# Patient Record
Sex: MALE | Race: WHITE | NOT HISPANIC OR LATINO | Employment: FULL TIME | URBAN - METROPOLITAN AREA
[De-identification: names, ages, dates, MRNs, and addresses within clinical notes are randomized per-mention and may not be internally consistent; named-entity substitution may affect disease eponyms.]

---

## 2017-09-28 ENCOUNTER — OFFICE VISIT (OUTPATIENT)
Dept: FAMILY MEDICINE | Facility: CLINIC | Age: 27
End: 2017-09-28
Payer: COMMERCIAL

## 2017-09-28 VITALS
DIASTOLIC BLOOD PRESSURE: 60 MMHG | OXYGEN SATURATION: 99 % | HEART RATE: 95 BPM | WEIGHT: 156.6 LBS | BODY MASS INDEX: 23.13 KG/M2 | SYSTOLIC BLOOD PRESSURE: 130 MMHG | TEMPERATURE: 98.4 F

## 2017-09-28 DIAGNOSIS — Z00.01 ENCOUNTER FOR ROUTINE ADULT MEDICAL EXAM WITH ABNORMAL FINDINGS: Primary | ICD-10-CM

## 2017-09-28 DIAGNOSIS — Z13.220 LIPID SCREENING: ICD-10-CM

## 2017-09-28 DIAGNOSIS — R00.2 HEART PALPITATIONS: ICD-10-CM

## 2017-09-28 LAB
ALBUMIN SERPL-MCNC: 4.4 G/DL (ref 3.4–5)
ALBUMIN UR-MCNC: NEGATIVE MG/DL
ALP SERPL-CCNC: 67 U/L (ref 40–150)
ALT SERPL W P-5'-P-CCNC: 21 U/L (ref 0–70)
ANION GAP SERPL CALCULATED.3IONS-SCNC: 5 MMOL/L (ref 3–14)
APPEARANCE UR: CLEAR
AST SERPL W P-5'-P-CCNC: 7 U/L (ref 0–45)
BACTERIA #/AREA URNS HPF: ABNORMAL /HPF
BILIRUB SERPL-MCNC: 0.5 MG/DL (ref 0.2–1.3)
BILIRUB UR QL STRIP: NEGATIVE
BUN SERPL-MCNC: 21 MG/DL (ref 7–30)
CALCIUM SERPL-MCNC: 9.1 MG/DL (ref 8.5–10.1)
CHLORIDE SERPL-SCNC: 104 MMOL/L (ref 94–109)
CHOLEST SERPL-MCNC: 170 MG/DL
CO2 SERPL-SCNC: 29 MMOL/L (ref 20–32)
COLOR UR AUTO: YELLOW
CREAT SERPL-MCNC: 1 MG/DL (ref 0.66–1.25)
ERYTHROCYTE [DISTWIDTH] IN BLOOD BY AUTOMATED COUNT: 12.3 % (ref 10–15)
FERRITIN SERPL-MCNC: 98 NG/ML (ref 26–388)
GFR SERPL CREATININE-BSD FRML MDRD: 89 ML/MIN/1.7M2
GLUCOSE SERPL-MCNC: 67 MG/DL (ref 70–99)
GLUCOSE UR STRIP-MCNC: NEGATIVE MG/DL
HCT VFR BLD AUTO: 43.2 % (ref 40–53)
HDLC SERPL-MCNC: 66 MG/DL
HGB BLD-MCNC: 15.1 G/DL (ref 13.3–17.7)
HGB UR QL STRIP: ABNORMAL
KETONES UR STRIP-MCNC: NEGATIVE MG/DL
LDLC SERPL CALC-MCNC: 93 MG/DL
LEUKOCYTE ESTERASE UR QL STRIP: NEGATIVE
MCH RBC QN AUTO: 30.1 PG (ref 26.5–33)
MCHC RBC AUTO-ENTMCNC: 35 G/DL (ref 31.5–36.5)
MCV RBC AUTO: 86 FL (ref 78–100)
NITRATE UR QL: NEGATIVE
NONHDLC SERPL-MCNC: 104 MG/DL
PH UR STRIP: 5.5 PH (ref 5–7)
PLATELET # BLD AUTO: 243 10E9/L (ref 150–450)
POTASSIUM SERPL-SCNC: 3.6 MMOL/L (ref 3.4–5.3)
PROT SERPL-MCNC: 8.2 G/DL (ref 6.8–8.8)
RBC # BLD AUTO: 5.01 10E12/L (ref 4.4–5.9)
RBC #/AREA URNS AUTO: ABNORMAL /HPF
SODIUM SERPL-SCNC: 138 MMOL/L (ref 133–144)
SOURCE: ABNORMAL
SP GR UR STRIP: 1.02 (ref 1–1.03)
TRIGL SERPL-MCNC: 56 MG/DL
TSH SERPL DL<=0.005 MIU/L-ACNC: 1.71 MU/L (ref 0.4–4)
URATE CRY #/AREA URNS HPF: ABNORMAL /HPF
UROBILINOGEN UR STRIP-ACNC: 0.2 EU/DL (ref 0.2–1)
WBC # BLD AUTO: 4.6 10E9/L (ref 4–11)
WBC #/AREA URNS AUTO: ABNORMAL /HPF

## 2017-09-28 PROCEDURE — 80061 LIPID PANEL: CPT | Performed by: PHYSICIAN ASSISTANT

## 2017-09-28 PROCEDURE — 93000 ELECTROCARDIOGRAM COMPLETE: CPT | Performed by: PHYSICIAN ASSISTANT

## 2017-09-28 PROCEDURE — 36415 COLL VENOUS BLD VENIPUNCTURE: CPT | Performed by: PHYSICIAN ASSISTANT

## 2017-09-28 PROCEDURE — 80053 COMPREHEN METABOLIC PANEL: CPT | Performed by: PHYSICIAN ASSISTANT

## 2017-09-28 PROCEDURE — 99395 PREV VISIT EST AGE 18-39: CPT | Performed by: PHYSICIAN ASSISTANT

## 2017-09-28 PROCEDURE — 84443 ASSAY THYROID STIM HORMONE: CPT | Performed by: PHYSICIAN ASSISTANT

## 2017-09-28 PROCEDURE — 99214 OFFICE O/P EST MOD 30 MIN: CPT | Mod: 25 | Performed by: PHYSICIAN ASSISTANT

## 2017-09-28 PROCEDURE — 81001 URINALYSIS AUTO W/SCOPE: CPT | Performed by: PHYSICIAN ASSISTANT

## 2017-09-28 PROCEDURE — 82728 ASSAY OF FERRITIN: CPT | Performed by: PHYSICIAN ASSISTANT

## 2017-09-28 PROCEDURE — 85027 COMPLETE CBC AUTOMATED: CPT | Performed by: PHYSICIAN ASSISTANT

## 2017-09-28 NOTE — PATIENT INSTRUCTIONS
Get labs done  Schedule follow up with cardiology  Return to clinic for any new or worsening symptoms or go to ER Urgent care in off hours    Preventive Health Recommendations  Male Ages 26 - 39    Yearly exam:             See your health care provider every year in order to  o   Review health changes.   o   Discuss preventive care.    o   Review your medicines if your doctor has prescribed any.    You should be tested each year for STDs (sexually transmitted diseases), if you re at risk.     After age 35, talk to your provider about cholesterol testing. If you are at risk for heart disease, have your cholesterol tested at least every 5 years.     If you are at risk for diabetes, you should have a diabetes test (fasting glucose).  Shots: Get a flu shot each year. Get a tetanus shot every 10 years.     Nutrition:    Eat at least 5 servings of fruits and vegetables daily.     Eat whole-grain bread, whole-wheat pasta and brown rice instead of white grains and rice.     Talk to your provider about Calcium and Vitamin D.     Lifestyle    Exercise for at least 150 minutes a week (30 minutes a day, 5 days a week). This will help you control your weight and prevent disease.     Limit alcohol to one drink per day.     No smoking.     Wear sunscreen to prevent skin cancer.     See your dentist every six months for an exam and cleaning.

## 2017-09-28 NOTE — NURSING NOTE
"Chief Complaint   Patient presents with     Physical       Initial /60  Pulse 95  Temp 98.4  F (36.9  C) (Oral)  Wt 156 lb 9.6 oz (71 kg)  SpO2 99%  BMI 23.13 kg/m2 Estimated body mass index is 23.13 kg/(m^2) as calculated from the following:    Height as of 1/16/15: 5' 9\" (1.753 m).    Weight as of this encounter: 156 lb 9.6 oz (71 kg).  Medication Reconciliation: complete     Julianne Jean MA      "

## 2017-09-28 NOTE — PROGRESS NOTES
SUBJECTIVE:   CC: Gabino Sihna is an 27 year old male who presents for preventative health visit.     Healthy Habits:    Do you get at least three servings of calcium containing foods daily (dairy, green leafy vegetables, etc.)? yes    Amount of exercise or daily activities, outside of work: 1 day(s) per week    Problems taking medications regularly not applicable    Medication side effects: No    Have you had an eye exam in the past two years? Yes thinks it has been just about 2 years    Do you see a dentist twice per year? No, last time was a year ago    Do you have sleep apnea, excessive snoring or daytime drowsiness?no    Questions/concerns: Feels as though his heart beats a little harder sometimes or double speed    Reports feeling irregular heartbeats since he was a teenager. Feels like it's been going on a little more lately, over the last 2-3 weeks.. occasional very heart heartbeat.   No chest pain or shortness of breath. He does experience a little dizziness associated   He can't correlate the irregular heartbeat with anxiety  No family history of heart disease  He doesn't drink caffeine  Not on any medications    Today's PHQ-2 Score:   PHQ-2 ( 1999 Pf[employer]) 9/28/2017 8/4/2015   Q1: Little interest or pleasure in doing things 0 0   Q2: Feeling down, depressed or hopeless 0 0   PHQ-2 Score 0 0         Abuse: Current or Past(Physical, Sexual or Emotional)- No  Do you feel safe in your environment - Yes  Social History   Substance Use Topics     Smoking status: Never Smoker     Smokeless tobacco: Never Used     Alcohol use Yes     The patient does not drink >3 drinks per day nor >7 drinks per week.    Last PSA: No results found for: PSA    Reviewed orders with patient. Reviewed health maintenance and updated orders accordingly - Yes  Labs reviewed in EPIC  BP Readings from Last 3 Encounters:   09/28/17 130/60   08/04/15 131/81   01/16/15 122/80    Wt Readings from Last 3 Encounters:   09/28/17 156 lb 9.6  oz (71 kg)   08/04/15 156 lb (70.8 kg)   01/16/15 155 lb 3.2 oz (70.4 kg)                      Reviewed and updated as needed this visit by clinical staff  Allergies  Meds         Reviewed and updated as needed this visit by Provider            ROS:  C: NEGATIVE for fever, chills, change in weight  I: NEGATIVE for worrisome rashes, moles or lesions  E: NEGATIVE for vision changes or irritation  ENT: NEGATIVE for ear, mouth and throat problems  R: NEGATIVE for significant cough or SOB  CV: NEGATIVE for chest pain/chest pressure, dyspnea on exertion, lower extremity edema, orthopnea and syncope or near-syncope  GI: NEGATIVE for nausea, abdominal pain, heartburn, or change in bowel habits   male: negative for dysuria, hematuria, decreased urinary stream, erectile dysfunction, urethral discharge  M: NEGATIVE for significant arthralgias or myalgia  N: NEGATIVE for weakness, dizziness or paresthesias  E: NEGATIVE for temperature intolerance, skin/hair changes  P: NEGATIVE for changes in mood or affect    OBJECTIVE:   /60  Pulse 95  Temp 98.4  F (36.9  C) (Oral)  Wt 156 lb 9.6 oz (71 kg)  SpO2 99%  BMI 23.13 kg/m2  EXAM:  GENERAL: healthy, alert and no distress  EYES: Eyes grossly normal to inspection, PERRL and conjunctivae and sclerae normal  HENT: ear canals and TM's normal, nose and mouth without ulcers or lesions  NECK: no adenopathy, no asymmetry, masses, or scars and thyroid normal to palpation  RESP: lungs clear to auscultation - no rales, rhonchi or wheezes  CV: regular rate and rhythm, normal S1 S2, no S3 or S4, no murmur, click or rub, no peripheral edema and peripheral pulses strong  ABDOMEN: soft, nontender, no hepatosplenomegaly, no masses and bowel sounds normal  MS: no gross musculoskeletal defects noted, no edema  SKIN: no suspicious lesions or rashes  NEURO: Normal strength and tone, mentation intact and speech normal  PSYCH: mentation appears normal, affect  "normal/bright    ASSESSMENT/PLAN:       ICD-10-CM    1. Encounter for routine adult medical exam with abnormal findings Z00.01    2. Heart palpitations R00.2 Comprehensive metabolic panel     CBC with platelets     Ferritin     TSH with free T4 reflex     *UA reflex to Microscopic     EKG 12-lead complete w/read - Clinics     CARDIO  ADULT REFERRAL     Urine Microscopic     OFFICE/OUTPT VISIT,EST,LEVL IV   3. Lipid screening Z13.220 Lipid panel reflex to direct LDL     Rule out heart arrhythmia. EKG normal. Negative for anemia and UTI. Still awaiting labs to rule out electrolyte abnormality, thyroid abnormality. As long as labs are normal, he'll follow up with cardiology for worsening heart palpations. Return to clinic for any new or worsening symptoms or go to ER Urgent care in off hours    COUNSELING:  Reviewed preventive health counseling, as reflected in patient instructions         reports that he has never smoked. He has never used smokeless tobacco.    Estimated body mass index is 23.13 kg/(m^2) as calculated from the following:    Height as of 1/16/15: 5' 9\" (1.753 m).    Weight as of this encounter: 156 lb 9.6 oz (71 kg).       Counseling Resources:  ATP IV Guidelines  Pooled Cohorts Equation Calculator  FRAX Risk Assessment  ICSI Preventive Guidelines  Dietary Guidelines for Americans, 2010  USDA's MyPlate  ASA Prophylaxis  Lung CA Screening    Camila Coker PA-C  [facility] [place]  "

## 2017-09-28 NOTE — MR AVS SNAPSHOT
After Visit Summary   9/28/2017    Gabino Sinha    MRN: [id number]           Patient Information     Date Of Birth          1990        Visit Information        Provider Department      9/28/2017 11:00 AM Camila Coker PA-C [facility] [place]        Today's Diagnoses     Heart palpitations    -  1    Routine general medical examination at a health care facility        Encounter for routine adult medical exam with abnormal findings        Lipid screening          Care Instructions    Get labs done  Schedule follow up with cardiology  Return to clinic for any new or worsening symptoms or go to ER Urgent care in off hours    Preventive Health Recommendations  Male Ages 26 - 39    Yearly exam:             See your health care provider every year in order to  o   Review health changes.   o   Discuss preventive care.    o   Review your medicines if your doctor has prescribed any.    You should be tested each year for STDs (sexually transmitted diseases), if you re at risk.     After age 35, talk to your provider about cholesterol testing. If you are at risk for heart disease, have your cholesterol tested at least every 5 years.     If you are at risk for diabetes, you should have a diabetes test (fasting glucose).  Shots: Get a flu shot each year. Get a tetanus shot every 10 years.     Nutrition:    Eat at least 5 servings of fruits and vegetables daily.     Eat whole-grain bread, whole-wheat pasta and brown rice instead of white grains and rice.     Talk to your provider about Calcium and Vitamin D.     Lifestyle    Exercise for at least 150 minutes a week (30 minutes a day, 5 days a week). This will help you control your weight and prevent disease.     Limit alcohol to one drink per day.     No smoking.     Wear sunscreen to prevent skin cancer.     See your dentist every six months for an exam and cleaning.             Follow-ups after your visit        Additional Services   "   CARDIO  ADULT REFERRAL       [facility] is referring you to Cardiology Services.       The  Representative will assist you in the coordination of your Cardiology care as prescribed by your physician.    The  Representative will call you within 24 hours to help schedule your appointment, or you may contact the  Representative at: [phone].         Type of Referral: New Cardiology Referral            Timeframe requested: within 4 weeks       Coverage of these services is subject to the terms and limitations of your health insurance plan.  Please call member services at your health plan with any benefit or coverage questions.      If X-rays, CT or MRI's have been performed, please contact the facility where they were done to arrange for , prior to your scheduled appointment.  Please bring this referral request to your appointment and present it to your specialist.                  Who to contact     If you have questions or need follow up information about today's clinic visit or your schedule please contact [facility] [place] at [phone].  Normal or non-critical lab and imaging results will be communicated to you by Avisenahart, letter or phone within 4 business days after the clinic has received the results. If you do not hear from us within 7 days, please contact the clinic through Avisenahart or phone. If you have a critical or abnormal lab result, we will notify you by phone as soon as possible.  Submit refill requests through DailyStrength or call your pharmacy and they will forward the refill request to us. Please allow 3 business days for your refill to be completed.          Additional Information About Your Visit        DailyStrength Information     DailyStrength lets you send messages to your doctor, view your test results, renew your prescriptions, schedule appointments and more. To sign up, go to [email or web address] . Click on \"Log in\" on " "the left side of the screen, which will take you to the Welcome page. Then click on \"Sign up Now\" on the right side of the page.     You will be asked to enter the access code listed below, as well as some personal information. Please follow the directions to create your username and password.     Your access code is: [email or web address]  Expires: 2017 11:22 AM     Your access code will  in 90 days. If you need help or a new code, please call your [facility] clinic or [phone].        Care EveryWhere ID     This is your Care EveryWhere ID. This could be used by other organizations to access your [facility] medical records  [id number]        Your Vitals Were     Pulse Temperature Pulse Oximetry BMI (Body Mass Index)          95 98.4  F (36.9  C) (Oral) 99% 23.13 kg/m2         Blood Pressure from Last 3 Encounters:   17 130/60   08/04/15 131/81   01/16/15 122/80    Weight from Last 3 Encounters:   17 156 lb 9.6 oz (71 kg)   08/04/15 156 lb (70.8 kg)   01/16/15 155 lb 3.2 oz (70.4 kg)              We Performed the Following     *UA reflex to Microscopic     CARDIO  ADULT REFERRAL     CBC with platelets     Comprehensive metabolic panel     EKG 12-lead complete w/read - Clinics     Ferritin     Lipid panel reflex to direct LDL     TSH with free T4 reflex        Primary Care Provider    None Specified       No primary provider on file.        Equal Access to Services     KANIKA ASHLEY : Hadii shayy Barnhart, waaxda lugavin, qaybta kaalmada mariano, lilly bernabe . So [facility] [phone].    ATENCIÓN: Si habla español, tiene a londono disposición servicios gratuitos de asistencia lingüística. Llame al [phone].    We comply with applicable federal civil rights laws and [place] laws. We do not discriminate on the basis of race, color, national origin, age, disability sex, sexual orientation or gender identity.            Thank you!     Thank you for choosing " [facility] [place] [facility]  for your care. Our goal is always to provide you with excellent care. Hearing back from our patients is one way we can continue to improve our services. Please take a few minutes to complete the written survey that you may receive in the mail after your visit with us. Thank you!             Your Updated Medication List - Protect others around you: Learn how to safely use, store and throw away your medicines at www.disposemymeds.org.          This list is accurate as of: 9/28/17 11:22 AM.  Always use your most recent med list.                   Brand Name Dispense Instructions for use Diagnosis    naproxen 500 MG tablet    NAPROSYN    30 tablet    Take 1 tablet (500 mg) by mouth 2 times daily as needed for moderate pain    Thoracic back pain

## 2017-11-04 ENCOUNTER — OFFICE VISIT (OUTPATIENT)
Dept: CARDIOLOGY | Facility: CLINIC | Age: 27
End: 2017-11-04
Attending: INTERNAL MEDICINE
Payer: COMMERCIAL

## 2017-11-04 VITALS
BODY MASS INDEX: 23.44 KG/M2 | WEIGHT: 158.3 LBS | HEART RATE: 86 BPM | OXYGEN SATURATION: 98 % | DIASTOLIC BLOOD PRESSURE: 84 MMHG | SYSTOLIC BLOOD PRESSURE: 125 MMHG | HEIGHT: 69 IN

## 2017-11-04 DIAGNOSIS — R00.2 HEART PALPITATIONS: Primary | ICD-10-CM

## 2017-11-04 PROCEDURE — 99213 OFFICE O/P EST LOW 20 MIN: CPT | Mod: ZF

## 2017-11-04 PROCEDURE — 99203 OFFICE O/P NEW LOW 30 MIN: CPT | Mod: ZP | Performed by: INTERNAL MEDICINE

## 2017-11-04 ASSESSMENT — PAIN SCALES - GENERAL: PAINLEVEL: NO PAIN (0)

## 2017-11-04 NOTE — NURSING NOTE
Chief Complaint   Patient presents with     New Patient     consult for palpitations, per pt     Vitals were taken and medications were reconciled.    Maria Luisa Garcia MA    9:19 AM

## 2017-11-04 NOTE — PATIENT INSTRUCTIONS
Patient Instructions:  It was a pleasure to see you in the cardiology clinic today.      If you have any questions, you can reach my nurse, Juan Francisco Henson, at [phone].  Press Option #1 for the [facility], and then press Option #3 for nursing.  We are encouraging the use of Bnookihart to communicate with your HealthCare Provider    Note the new medications: none    Stop the following medications: none    The results from today include: none    Studies ordered: Echocardiogram and 2 week Ziopatch        Please follow up with Dr. Cornelius - as needed, We will call you with results    I have sent the [employer]  a message to have them call you on Monday and also gave you the number to the  as well. If you have any questions please feel free to call our triage line at  option #1 for university and option #3 for triage.     Sincerely,    NEIL Cornelius MD     If you have an urgent need after hours (8:00 am to 4:30 pm) please call [phone] and ask for the cardiology fellow on call.

## 2017-11-04 NOTE — MR AVS SNAPSHOT
After Visit Summary   11/4/2017    Gabino Sinha    MRN: [id number]           Patient Information     Date Of Birth          1990        Visit Information        Provider Department      11/4/2017 9:30 AM NEIL Madrid MD [facility]        Today's Diagnoses     Heart palpitations    -  1      Care Instructions    Patient Instructions:  It was a pleasure to see you in the cardiology clinic today.      If you have any questions, you can reach my nurse, Juan Francisco Henson, at [phone].  Press Option #1 for the [facility], and then press Option #3 for nursing.  We are encouraging the use of Switchboard to communicate with your HealthCare Provider    Note the new medications: none    Stop the following medications: none    The results from today include: none    Studies ordered: Echocardiogram and 2 week Ziopatch        Please follow up with Dr. Cornelius - as needed, We will call you with results    I have sent the [employer]  a message to have them call you on Monday and also gave you the number to the  as well. If you have any questions please feel free to call our triage line at  option #1 for [place] and option #3 for triage.     Sincerely,    NEIL Cornelius MD     If you have an urgent need after hours (8:00 am to 4:30 pm) please call [phone] and ask for the cardiology fellow on call.                    Follow-ups after your visit        Future tests that were ordered for you today     Open Future Orders        Priority Expected Expires Ordered    Echocardiogram Complete Routine 11/6/2017 11/4/2018 11/4/2017    Ziopatch Holter Monitor - Adult Routine 11/6/2017 1/3/2018 11/4/2017            Who to contact     If you have questions or need follow up information about today's clinic visit or your schedule please contact [facility] directly at [phone].  Normal or non-critical lab and imaging results will be communicated  "to you by AMVONEThart, letter or phone within 4 business days after the clinic has received the results. If you do not hear from us within 7 days, please contact the clinic through Fastclick or phone. If you have a critical or abnormal lab result, we will notify you by phone as soon as possible.  Submit refill requests through Fastclick or call your pharmacy and they will forward the refill request to us. Please allow 3 business days for your refill to be completed.          Additional Information About Your Visit        Fastclick Information     Fastclick lets you send messages to your doctor, view your test results, renew your prescriptions, schedule appointments and more. To sign up, go to [email or web address] . Click on \"Log in\" on the left side of the screen, which will take you to the Welcome page. Then click on \"Sign up Now\" on the right side of the page.     You will be asked to enter the access code listed below, as well as some personal information. Please follow the directions to create your username and password.     Your access code is: [email or web address]  Expires: 2017 11:22 AM     Your access code will  in 90 days. If you need help or a new code, please call your [facility] clinic or [phone].        Care EveryWhere ID     This is your Care EveryWhere ID. This could be used by other organizations to access your [facility] medical records  [id number]        Your Vitals Were     Pulse Height Pulse Oximetry BMI (Body Mass Index)          86 1.753 m (5' 9\") 98% 23.38 kg/m2         Blood Pressure from Last 3 Encounters:   17 125/84   17 130/60   08/04/15 131/81    Weight from Last 3 Encounters:   17 71.8 kg (158 lb 4.8 oz)   17 71 kg (156 lb 9.6 oz)   08/04/15 70.8 kg (156 lb)               Primary Care Provider Fax #    Provider Not In System [phone]                Equal Access to Services     GWEN ASHLEY AH: Hadii shayy Barnhart, ryan zarate, george roche " lilly quintanaeda natashamarquita diazaaroselyn ah. [facility] [phone].    ATENCIÓN: Si habla armen, tiene a londono disposición servicios gratuitos de asistencia lingüística. Viky al [phone].    We comply with applicable federal civil rights laws and [place] laws. We do not discriminate on the basis of race, color, national origin, age, disability, sex, sexual orientation, or gender identity.            Thank you!     Thank you for choosing [facility]  for your care. Our goal is always to provide you with excellent care. Hearing back from our patients is one way we can continue to improve our services. Please take a few minutes to complete the written survey that you may receive in the mail after your visit with us. Thank you!             Your Updated Medication List - Protect others around you: Learn how to safely use, store and throw away your medicines at www.disposemymeds.org.          This list is accurate as of: 11/4/17  9:46 AM.  Always use your most recent med list.                   Brand Name Dispense Instructions for use Diagnosis    naproxen 500 MG tablet    NAPROSYN    30 tablet    Take 1 tablet (500 mg) by mouth 2 times daily as needed for moderate pain    Thoracic back pain

## 2017-11-04 NOTE — PROGRESS NOTES
SUBJECTIVE:  Gabino Sinha is a 27 year old male who presents for evaluation of palpitation.    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  Feels skipped beats. Mostly at rest. Had similar problem during teenage,but more frequent now. No dizziness/LOC. No fast arrhythmia. No excess coffee or alcohol.  Non smoker. No other medical issues.  F/H unremarkable.    Patient Active Problem List    Diagnosis Date Noted     Bilateral low back pain without sciatica 08/11/2015     Priority: Medium     Thoracic back pain 08/04/2015     Priority: Medium    .  Current Outpatient Prescriptions   Medication Sig     naproxen (NAPROSYN) 500 MG tablet Take 1 tablet (500 mg) by mouth 2 times daily as needed for moderate pain (Patient not taking: Reported on 9/28/2017)     No current facility-administered medications for this visit.      Past Medical History:   Diagnosis Date     Other acne      Past Surgical History:   Procedure Laterality Date     NO HISTORY OF SURGERY       No Known Allergies  Social History     Social History     Marital status: Single     Spouse name: N/A     Number of children: N/A     Years of education: N/A     Occupational History     Not on file.     Social History Main Topics     Smoking status: Never Smoker     Smokeless tobacco: Never Used     Alcohol use Yes     Drug use: No     Sexual activity: Yes     Partners: Female     Birth control/ protection: Pill     Other Topics Concern     Not on file     Social History Narrative     Family History   Problem Relation Age of Onset     C.A.D. No family hx of      CEREBROVASCULAR DISEASE No family hx of      Cancer - colorectal No family hx of      Prostate Cancer No family hx of      Hypertension No family hx of           REVIEW OF SYSTEMS:  General: negative, fever, chills, night sweats  Skin: negative, acne, rash and scaling  Eyes: negative, double vision, eye pain and photophobia  Ears/Nose/Throat:  "negative, nasal congestion and purulent rhinorrhea  Respiratory: No dyspnea on exertion, No cough, No hemoptysis and negative  Cardiovascular: negative, chest pain, exertional chest pain or pressure, paroxysmal nocturnal dyspnea, dyspnea on exertion and orthopnea  Gastrointestinal: negative, dysphagia, nausea and vomiting  Genitourinary: negative, nocturia, dysuria and frequency  Musculoskeletal: negative, fracture,, neck pain and arthritis  Neurologic: negative, headaches, syncope, stroke, seizures and paralysis  Psychiatric: negative, nervous breakdown, thoughts of self-harm and thoughts of hurting someone else  Hematologic/Lymphatic/Immunologic: negative, bleeding disorder, chills and fever  Endocrine: negative, cold intolerance, heat intolerance and hot flashes       OBJECTIVE:  Blood pressure 125/84, pulse 86, height 1.753 m (5' 9\"), weight 71.8 kg (158 lb 4.8 oz), SpO2 98 %.  General Appearance: healthy, alert, active and no distress  Head: Normocephalic. No masses, lesions, tenderness or abnormalities  Eyes: conjuctiva clear, PERRL, EOM intact  Ears: External ears normal. Canals clear. TM's normal.  Nose: Nares normal  Mouth: normal  Neck: Supple, no cervical adenopathy, no thyromegaly  Lungs: clear to auscultation  Cardiac: regular rate and rhythm, normal S1 and S2, no murmur  Abdomen: Soft, nontender.  Normal bowel sounds.  No hepatosplenomegaly or abnormal masses  Extremities: no peripheral edema, peripheral pulses normal  Musculoskeletal: negative  Neurological: Cranial nerves 2-12 intact, motor strength intact       ASSESSMENT/PLAN:  Young male with skipped beats.  No symptoms.  No dizziness/LOC.  No excess coffee or alcohol.  F/H unremarkable.  Reviewed EKG. NSR. Normal.  Discussed benign nature of symptoms.  Will check an echo and 2 weeks Zio.  Per orders.   Return to Clinic PRN.  "

## 2017-11-04 NOTE — LETTER
11/4/2017      RE: Gabino Sinha  [address]   [facility] [id number]       Dear Colleague,    Thank you for the opportunity to participate in the care of your patient, Gabino Sinha, at the [facility]. Please see a copy of my visit note below.       SUBJECTIVE:  Gabino Sinha is a 27 year old male who presents for evaluation of palpitation.    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  Feels skipped beats. Mostly at rest. Had similar problem during teenage,but more frequent now. No dizziness/LOC. No fast arrhythmia. No excess coffee or alcohol.  Non smoker. No other medical issues.  F/H unremarkable.    Patient Active Problem List    Diagnosis Date Noted     Bilateral low back pain without sciatica 08/11/2015     Priority: Medium     Thoracic back pain 08/04/2015     Priority: Medium    .  Current Outpatient Prescriptions   Medication Sig     naproxen (NAPROSYN) 500 MG tablet Take 1 tablet (500 mg) by mouth 2 times daily as needed for moderate pain (Patient not taking: Reported on 9/28/2017)     No current facility-administered medications for this visit.      Past Medical History:   Diagnosis Date     Other acne      Past Surgical History:   Procedure Laterality Date     NO HISTORY OF SURGERY       No Known Allergies  Social History     Social History     Marital status: Single     Spouse name: N/A     Number of children: N/A     Years of education: N/A     Occupational History     Not on file.     Social History Main Topics     Smoking status: Never Smoker     Smokeless tobacco: Never Used     Alcohol use Yes     Drug use: No     Sexual activity: Yes     Partners: Female     Birth control/ protection: Pill     Other Topics Concern     Not on file     Social History Narrative     Family History   Problem Relation Age of Onset     C.A.D. No family hx of      CEREBROVASCULAR DISEASE No  "family hx of      Cancer - colorectal No family hx of      Prostate Cancer No family hx of      Hypertension No family hx of           REVIEW OF SYSTEMS:  General: negative, fever, chills, night sweats  Skin: negative, acne, rash and scaling  Eyes: negative, double vision, eye pain and photophobia  Ears/Nose/Throat: negative, nasal congestion and purulent rhinorrhea  Respiratory: No dyspnea on exertion, No cough, No hemoptysis and negative  Cardiovascular: negative, chest pain, exertional chest pain or pressure, paroxysmal nocturnal dyspnea, dyspnea on exertion and orthopnea  Gastrointestinal: negative, dysphagia, nausea and vomiting  Genitourinary: negative, nocturia, dysuria and frequency  Musculoskeletal: negative, fracture,, neck pain and arthritis  Neurologic: negative, headaches, syncope, stroke, seizures and paralysis  Psychiatric: negative, nervous breakdown, thoughts of self-harm and thoughts of hurting someone else  Hematologic/Lymphatic/Immunologic: negative, bleeding disorder, chills and fever  Endocrine: negative, cold intolerance, heat intolerance and hot flashes       OBJECTIVE:  Blood pressure 125/84, pulse 86, height 1.753 m (5' 9\"), weight 71.8 kg (158 lb 4.8 oz), SpO2 98 %.  General Appearance: healthy, alert, active and no distress  Head: Normocephalic. No masses, lesions, tenderness or abnormalities  Eyes: conjuctiva clear, PERRL, EOM intact  Ears: External ears normal. Canals clear. TM's normal.  Nose: Nares normal  Mouth: normal  Neck: Supple, no cervical adenopathy, no thyromegaly  Lungs: clear to auscultation  Cardiac: regular rate and rhythm, normal S1 and S2, no murmur  Abdomen: Soft, nontender.  Normal bowel sounds.  No hepatosplenomegaly or abnormal masses  Extremities: no peripheral edema, peripheral pulses normal  Musculoskeletal: negative  Neurological: Cranial nerves 2-12 intact, motor strength intact       ASSESSMENT/PLAN:  Young male with skipped beats.  No symptoms.  No " dizziness/LOC.  No excess coffee or alcohol.  F/H unremarkable.  Reviewed EKG. NSR. Normal.  Discussed benign nature of symptoms.  Will check an echo and 2 weeks Zio.  Per orders.   Return to Clinic PRN.    NEIL Madrid MD

## 2017-11-07 ENCOUNTER — CARE COORDINATION (OUTPATIENT)
Dept: CARDIOLOGY | Facility: CLINIC | Age: 27
End: 2017-11-07

## 2017-11-07 ENCOUNTER — ALLIED HEALTH/NURSE VISIT (OUTPATIENT)
Dept: CARDIOLOGY | Facility: CLINIC | Age: 27
End: 2017-11-07
Payer: COMMERCIAL

## 2017-11-07 ENCOUNTER — RADIANT APPOINTMENT (OUTPATIENT)
Dept: CARDIOLOGY | Facility: CLINIC | Age: 27
End: 2017-11-07

## 2017-11-07 DIAGNOSIS — R00.2 HEART PALPITATIONS: ICD-10-CM

## 2017-11-07 PROCEDURE — 0296T ZIO PATCH HOLTER: CPT | Mod: ZF

## 2017-11-07 PROCEDURE — 0298T ZZC EXT ECG > 48HR TO 21 DAY REVIEW AND INTERPRETATN: CPT | Performed by: INTERNAL MEDICINE

## 2017-11-07 NOTE — PROGRESS NOTES
Patient calls in stating he had an episode today.   He saw Dr. Cornelius on Saturday to evaluate his skipped heart beats. An echocardiogram and ziopatch were recommended, but have not been done yet.     Symptoms lasting 30-45 minutes, worse with sitting:  - Severe SOB  -Dizziness  -Tingling in fingers  - Denies chest pain, tachycardia, dehydration    Pt does not believe that was any event that provoked the symptoms and they went away on their own. No use of caffeine.    Reviewed symptoms that should prompt a visit to ER.   Scheduled echo and zio hook up for today.     Patient verbalized understanding and is in agreement to the plan.    Will route to Dr. Cornelius's RNCC as FYI.

## 2017-11-07 NOTE — NURSING NOTE
Per Dr. MARJORIE Cornelius, patient to have 14 day Zio monitor placed.  Diagnosis: palpitations  Monitor placed: Yes  Patient Instructed: Yes  Patient verbalized understanding: Yes  Holter # [id number]  Placed by KRISTIN Walsh

## 2017-11-07 NOTE — MR AVS SNAPSHOT
"              After Visit Summary   2017    Gabino Sinha    MRN: [id number]           Patient Information     Date Of Birth          1990        Visit Information        Provider Department      2017 2:00 PM Tech, [facility] Cvc Monitor, [facility]        Today's Diagnoses     Heart palpitations           Follow-ups after your visit        Who to contact     If you have questions or need follow up information about today's clinic visit or your schedule please contact [facility] directly at [phone].  Normal or non-critical lab and imaging results will be communicated to you by BenchBankinghart, letter or phone within 4 business days after the clinic has received the results. If you do not hear from us within 7 days, please contact the clinic through [employer] or phone. If you have a critical or abnormal lab result, we will notify you by phone as soon as possible.  Submit refill requests through [employer] or call your pharmacy and they will forward the refill request to us. Please allow 3 business days for your refill to be completed.          Additional Information About Your Visit        MyChart Information     [employer] Platform lets you send messages to your doctor, view your test results, renew your prescriptions, schedule appointments and more. To sign up, go to [email or web address] . Click on \"Log in\" on the left side of the screen, which will take you to the Welcome page. Then click on \"Sign up Now\" on the right side of the page.     You will be asked to enter the access code listed below, as well as some personal information. Please follow the directions to create your username and password.     Your access code is: [email or web address]  Expires: 2017 10:22 AM     Your access code will  in 90 days. If you need help or a new code, please call your [place] clinic or [phone].        Care EveryWhere ID     This is your Care EveryWhere ID. This could be used by other organizations to access " your [facility] medical records  [id number]         Blood Pressure from Last 3 Encounters:   11/04/17 125/84   09/28/17 130/60   08/04/15 131/81    Weight from Last 3 Encounters:   11/04/17 71.8 kg (158 lb 4.8 oz)   09/28/17 71 kg (156 lb 9.6 oz)   08/04/15 70.8 kg (156 lb)              We Performed the Following     Ziopatch Holter Monitor - Adult        Primary Care Provider Fax #    Provider Not In System [phone]                Equal Access to Services     [facility]: Hadii aad ku hadasho Sopatrice, waaxda luqadaha, qaybta kaalmada adeegyaalbina, lilly bernabe . So [facility] [phone].    ATENCIÓN: Si habla español, tiene a londono disposición servicios gratuitos de asistencia lingüística. Llame al [phone].    We comply with applicable federal civil rights laws and [place] laws. We do not discriminate on the basis of race, color, national origin, age, disability, sex, sexual orientation, or gender identity.            Thank you!     Thank you for choosing [facility]  for your care. Our goal is always to provide you with excellent care. Hearing back from our patients is one way we can continue to improve our services. Please take a few minutes to complete the written survey that you may receive in the mail after your visit with us. Thank you!             Your Updated Medication List - Protect others around you: Learn how to safely use, store and throw away your medicines at www.disposemymeds.org.          This list is accurate as of: 11/7/17  5:47 PM.  Always use your most recent med list.                   Brand Name Dispense Instructions for use Diagnosis    naproxen 500 MG tablet    NAPROSYN    30 tablet    Take 1 tablet (500 mg) by mouth 2 times daily as needed for moderate pain    Thoracic back pain

## 2020-12-18 ENCOUNTER — VIRTUAL VISIT (OUTPATIENT)
Dept: FAMILY MEDICINE | Facility: CLINIC | Age: 30
End: 2020-12-18
Payer: COMMERCIAL

## 2020-12-18 DIAGNOSIS — H02.883 MEIBOMIAN GLAND DYSFUNCTION OF RIGHT EYE: Primary | ICD-10-CM

## 2020-12-18 PROCEDURE — 99213 OFFICE O/P EST LOW 20 MIN: CPT | Mod: 95 | Performed by: NURSE PRACTITIONER

## 2020-12-18 NOTE — PROGRESS NOTES
"Gabino Sinha is a 30 year old male who is being evaluated via a billable video visit.      The patient has been notified of following:     \"This video visit will be conducted via a call between you and your physician/provider. We have found that certain health care needs can be provided without the need for an in-person physical exam.  This service lets us provide the care you need with a video conversation.  If a prescription is necessary we can send it directly to your pharmacy.  If lab work is needed we can place an order for that and you can then stop by our lab to have the test done at a later time.    Video visits are billed at different rates depending on your insurance coverage.  Please reach out to your insurance provider with any questions.    If during the course of the call the physician/provider feels a video visit is not appropriate, you will not be charged for this service.\"    Patient has given verbal consent for Video visit? Yes  How would you like to obtain your AVS? MyChart  If you are dropped from the video visit, the video invite should be resent to: Text to cell phone: l  Will anyone else be joining your video visit? No      Subjective     Gabino Sinha is a 30 year old male who presents today via video visit for the following health issues:  Developed a red raised tender lesion of the right upper eyelid about 11/25  Has not use any OTC treatement  It is no longer tender, not as raised or red but there still is a bump     HPI       Video Start Time: 2:30    Review of Systems   Constitutional, HEENT, cardiovascular, pulmonary, gi and gu systems are negative, except as otherwise noted.      Objective           Vitals:  No vitals were obtained today due to virtual visit.    Physical Exam     GENERAL: Healthy, alert and no distress  EYES: Eyes grossly normal to inspection; right mid upper lid has a small raised lesion without sign of erythema or general eyelid edema.  No discharge or erythema, " or obvious scleral/conjunctival abnormalities.  RESP: No audible wheeze, cough, or visible cyanosis.  No visible retractions or increased work of breathing.    SKIN: Visible skin clear. No significant rash, abnormal pigmentation or lesions.  NEURO: Cranial nerves grossly intact.  Mentation and speech appropriate for age.  PSYCH: Mentation appears normal, affect normal/bright, judgement and insight intact, normal speech and appearance well-groomed.         Assessment & Plan     Meibomian gland dysfunction of right eye  He will begin warm compresses for 10 min 3xday for 2 weeks ; if not resoled will follow up with optomitry or ophthalmology      KISHORE Ybarra CNP  [facility]      Video-Visit Details    Type of service:  Video Visit    Video End Time:2:35    Originating Location (pt. Location): Home    Distant Location (provider location):  [facility]     Platform used for Video Visit: [employer] Online

## 2021-01-15 ENCOUNTER — HEALTH MAINTENANCE LETTER (OUTPATIENT)
Age: 31
End: 2021-01-15

## 2021-10-24 ENCOUNTER — HEALTH MAINTENANCE LETTER (OUTPATIENT)
Age: 31
End: 2021-10-24

## 2022-02-13 ENCOUNTER — HEALTH MAINTENANCE LETTER (OUTPATIENT)
Age: 32
End: 2022-02-13

## 2022-03-08 ENCOUNTER — PRE VISIT (OUTPATIENT)
Dept: UROLOGY | Facility: CLINIC | Age: 32
End: 2022-03-08
Payer: COMMERCIAL

## 2022-03-08 NOTE — TELEPHONE ENCOUNTER
Reason for visit: Consult     Relevant information: penile lesion    Records/imaging/labs/orders: in EPIC    Pt called: no    At Rooming: normal    Action 03/08/2022 JTV 1:32pm   Action Taken CSS called patient. Patient confirmed he has had No images.  Patient gave VB Ok to update Medical Records.

## 2022-10-15 ENCOUNTER — HEALTH MAINTENANCE LETTER (OUTPATIENT)
Age: 32
End: 2022-10-15

## 2023-03-26 ENCOUNTER — HEALTH MAINTENANCE LETTER (OUTPATIENT)
Age: 33
End: 2023-03-26

## 2023-05-16 ASSESSMENT — ENCOUNTER SYMPTOMS
NERVOUS/ANXIOUS: 0
HEMATURIA: 0
PARESTHESIAS: 0
EYE PAIN: 0
HEMATOCHEZIA: 0
PALPITATIONS: 0
WEAKNESS: 0
ARTHRALGIAS: 0
SHORTNESS OF BREATH: 0
HEADACHES: 0
FREQUENCY: 0
DIARRHEA: 0
COUGH: 0
NAUSEA: 0
CONSTIPATION: 0
SORE THROAT: 0
FEVER: 0
HEARTBURN: 0
JOINT SWELLING: 0
ABDOMINAL PAIN: 0
DYSURIA: 0
CHILLS: 0
MYALGIAS: 0
DIZZINESS: 0

## 2023-05-23 ENCOUNTER — OFFICE VISIT (OUTPATIENT)
Dept: FAMILY MEDICINE | Facility: CLINIC | Age: 33
End: 2023-05-23
Payer: COMMERCIAL

## 2023-05-23 VITALS
WEIGHT: 160.5 LBS | BODY MASS INDEX: 24.32 KG/M2 | RESPIRATION RATE: 14 BRPM | HEART RATE: 74 BPM | HEIGHT: 68 IN | DIASTOLIC BLOOD PRESSURE: 78 MMHG | OXYGEN SATURATION: 99 % | TEMPERATURE: 98.1 F | SYSTOLIC BLOOD PRESSURE: 118 MMHG

## 2023-05-23 DIAGNOSIS — Z13.220 SCREENING FOR LIPID DISORDERS: ICD-10-CM

## 2023-05-23 DIAGNOSIS — Z23 NEED FOR VACCINATION: ICD-10-CM

## 2023-05-23 DIAGNOSIS — Z00.00 ROUTINE GENERAL MEDICAL EXAMINATION AT A HEALTH CARE FACILITY: Primary | ICD-10-CM

## 2023-05-23 DIAGNOSIS — Z13.1 SCREENING FOR DIABETES MELLITUS: ICD-10-CM

## 2023-05-23 DIAGNOSIS — Z11.59 NEED FOR HEPATITIS C SCREENING TEST: ICD-10-CM

## 2023-05-23 DIAGNOSIS — Z11.4 SCREENING FOR HIV (HUMAN IMMUNODEFICIENCY VIRUS): ICD-10-CM

## 2023-05-23 LAB
CHOLEST SERPL-MCNC: 184 MG/DL
FASTING STATUS PATIENT QL REPORTED: YES
GLUCOSE SERPL-MCNC: 101 MG/DL (ref 70–99)
HBV SURFACE AB SERPL IA-ACNC: 241.77 M[IU]/ML
HBV SURFACE AB SERPL IA-ACNC: REACTIVE M[IU]/ML
HBV SURFACE AG SERPL QL IA: NONREACTIVE
HCV AB SERPL QL IA: NONREACTIVE
HDLC SERPL-MCNC: 59 MG/DL
HIV 1+2 AB+HIV1 P24 AG SERPL QL IA: NONREACTIVE
LDLC SERPL CALC-MCNC: 111 MG/DL
NONHDLC SERPL-MCNC: 125 MG/DL
TRIGL SERPL-MCNC: 72 MG/DL

## 2023-05-23 PROCEDURE — 86706 HEP B SURFACE ANTIBODY: CPT | Performed by: FAMILY MEDICINE

## 2023-05-23 PROCEDURE — 80061 LIPID PANEL: CPT | Performed by: FAMILY MEDICINE

## 2023-05-23 PROCEDURE — 87340 HEPATITIS B SURFACE AG IA: CPT | Performed by: FAMILY MEDICINE

## 2023-05-23 PROCEDURE — 99395 PREV VISIT EST AGE 18-39: CPT | Performed by: FAMILY MEDICINE

## 2023-05-23 PROCEDURE — 36415 COLL VENOUS BLD VENIPUNCTURE: CPT | Performed by: FAMILY MEDICINE

## 2023-05-23 PROCEDURE — 82947 ASSAY GLUCOSE BLOOD QUANT: CPT | Performed by: FAMILY MEDICINE

## 2023-05-23 PROCEDURE — 87389 HIV-1 AG W/HIV-1&-2 AB AG IA: CPT | Performed by: FAMILY MEDICINE

## 2023-05-23 PROCEDURE — 86803 HEPATITIS C AB TEST: CPT | Performed by: FAMILY MEDICINE

## 2023-05-23 ASSESSMENT — ENCOUNTER SYMPTOMS
NERVOUS/ANXIOUS: 0
DIZZINESS: 0
COUGH: 0
JOINT SWELLING: 0
FEVER: 0
PARESTHESIAS: 0
ARTHRALGIAS: 0
HEARTBURN: 0
EYE PAIN: 0
FREQUENCY: 0
SHORTNESS OF BREATH: 0
NAUSEA: 0
PALPITATIONS: 0
HEMATOCHEZIA: 0
HEADACHES: 0
MYALGIAS: 0
HEMATURIA: 0
SORE THROAT: 0
CONSTIPATION: 0
DYSURIA: 0
DIARRHEA: 0
WEAKNESS: 0
ABDOMINAL PAIN: 0
CHILLS: 0

## 2023-05-23 ASSESSMENT — PAIN SCALES - GENERAL: PAINLEVEL: NO PAIN (0)

## 2023-05-23 NOTE — PATIENT INSTRUCTIONS
-Normal  BP is  119/79 or lower.     Upper number is systolic Blood pressure (SBP). Lower number is diastolic  Blood pressure (DBP)      -Blood pressure between 120-139/80-89 (prehypertensive blood pressure/ class 1 hypertension )       -Hypertension is  BP of 140/90 or above and needs treatment with medication.  -life style changes that are beneficial to reach goal of normal Blood pressure   1.Weight loss of 1 kg can reduce systolic Blood pressure  (SBP) by 1 mmHg  2.Health healthy diet (eg DASH dietary pattern can reduce SBP by 11 mmHg)  3.Structured excercise program (150 mins aerobic activity/week can reduce SBP by 5 mmHg)  4.Low salt  diet - very important.(eg: cut by 255 or 1000 mg/day to reduce SBP by 5mmHg)  5. Increase 4-5 serving of fresh vegetables & fruits /day- good source of potassium.    Other beneficial tips. Complete smoke cessation- if smoking  Reduction of alcohol     if More than 140/90  after a month of above life style changes  Return visit with MD/provider  for recheck & consideration of medications .

## 2023-05-23 NOTE — PROGRESS NOTES
"SUBJECTIVE:   CC: Terry is an 32 year old who presents for preventative health visit.       5/23/2023     8:37 AM   Additional Questions   Roomed by Ruthie     Patient has been advised of split billing requirements and indicates understanding: Yes  Healthy Habits:     Getting at least 3 servings of Calcium per day:  Yes    Bi-annual eye exam:  NO    Dental care twice a year:  NO    Sleep apnea or symptoms of sleep apnea:  None    Diet:  Regular (no restrictions)    Frequency of exercise:  4-5 days/week    Duration of exercise:  15-30 minutes    Taking medications regularly:  Yes    Medication side effects:  Not applicable    PHQ-2 Total Score: 0    Additional concerns today:  No    Checked Blood pressure on & off- lately more  A month ago- \"feeling discomfort\" - hard to explain- describes it as not feeling completely healthy- so started checking Blood pressure  for 3 weeks and noted it was probably as high as 160. Did not bring Blood pressure number and reports hard to recall but            PROBLEMS TO ADD ON...    Have you ever done Advance Care Planning? (For example, a Health Directive, POLST, or a discussion with a medical provider or your loved ones about your wishes): No, advance care planning information given to patient to review.  Patient plans to discuss their wishes with loved ones or provider.      Social History     Tobacco Use     Smoking status: Never     Smokeless tobacco: Never   Vaping Use     Vaping status: Not on file   Substance Use Topics     Alcohol use: Yes             5/16/2023    10:16 AM   Alcohol Use   Prescreen: >3 drinks/day or >7 drinks/week? Yes   AUDIT SCORE  7         5/16/2023    10:16 AM   AUDIT - Alcohol Use Disorders Identification Test - Reproduced from the World Health Organization Audit 2001 (Second Edition)   1.  How often do you have a drink containing alcohol? 2 to 3 times a week   2.  How many drinks containing alcohol do you have on a typical day when you are drinking? 3 " or 4   3.  How often do you have five or more drinks on one occasion? Monthly   4.  How often during the last year have you found that you were not able to stop drinking once you had started? Never   5.  How often during the last year have you failed to do what was normally expected of you because of drinking? Never   6.  How often during the last year have you needed a first drink in the morning to get yourself going after a heavy drinking session? Never   7.  How often during the last year have you had a feeling of guilt or remorse after drinking? Never   8.  How often during the last year have you been unable to remember what happened the night before because of your drinking? Less than monthly   9.  Have you or someone else been injured because of your drinking? No   10. Has a relative, friend, doctor or other health care worker been concerned about your drinking or suggested you cut down? No   TOTAL SCORE 7       Last PSA: No results found for: PSA    Reviewed orders with patient. Reviewed health maintenance and updated orders accordingly - Yes  BP Readings from Last 3 Encounters:   05/23/23 118/78   11/04/17 125/84   09/28/17 130/60    Wt Readings from Last 3 Encounters:   05/23/23 72.8 kg (160 lb 8 oz)   11/04/17 71.8 kg (158 lb 4.8 oz)   09/28/17 71 kg (156 lb 9.6 oz)                    Reviewed and updated as needed this visit by clinical staff   Tobacco  Allergies  Meds  Problems  Med Hx  Surg Hx  Fam Hx          Reviewed and updated as needed this visit by Provider   Tobacco  Allergies  Meds  Problems  Med Hx  Surg Hx  Fam Hx           Review of Systems   Constitutional: Negative for chills and fever.   HENT: Negative for congestion, ear pain, hearing loss and sore throat.    Eyes: Negative for pain and visual disturbance.   Respiratory: Negative for cough and shortness of breath.    Cardiovascular: Negative for chest pain, palpitations and peripheral edema.   Gastrointestinal: Negative for  "abdominal pain, constipation, diarrhea, heartburn, hematochezia and nausea.   Genitourinary: Negative for dysuria, frequency, genital sores, hematuria, impotence, penile discharge and urgency.   Musculoskeletal: Negative for arthralgias, joint swelling and myalgias.   Skin: Negative for rash.   Neurological: Negative for dizziness, weakness, headaches and paresthesias.   Psychiatric/Behavioral: Negative for mood changes. The patient is not nervous/anxious.          OBJECTIVE:   /78 (BP Location: Left arm, Patient Position: Sitting, Cuff Size: Adult Regular)   Pulse 74   Temp 98.1  F (36.7  C) (Temporal)   Resp 14   Ht 1.736 m (5' 8.35\")   Wt 72.8 kg (160 lb 8 oz)   SpO2 99%   BMI 24.16 kg/m      Physical Exam  GENERAL: healthy, alert and no distress  EYES: Eyes grossly normal to inspection, PERRL and conjunctivae and sclerae normal  HENT: ear canals and TM's normal, nose and mouth without ulcers or lesions  NECK: no adenopathy, no asymmetry, masses, or scars and thyroid normal to palpation  RESP: lungs clear to auscultation - no rales, rhonchi or wheezes  CV: regular rate and rhythm, normal S1 S2, no S3 or S4, no murmur, click or rub, no peripheral edema and peripheral pulses strong  ABDOMEN: soft, nontender, no hepatosplenomegaly, no masses and bowel sounds normal  MS: no gross musculoskeletal defects noted, no edema  SKIN: no suspicious lesions or rashes  NEURO: Normal strength and tone, mentation intact and speech normal  PSYCH: mentation appears normal, affect normal/bright    Diagnostic Test Results:  Labs reviewed in Epic  No results found for this or any previous visit (from the past 24 hour(s)).    ASSESSMENT/PLAN:   Gabino was seen today for physical.    Diagnoses and all orders for this visit:    Routine general medical examination at a health care facility  Comments:  Fasting for labs. maybe given HBV booster in childhood - no avaliable records- and reports hard to retrieve- willing to " complete the blood test to confirm  Orders:  -     REVIEW OF HEALTH MAINTENANCE PROTOCOL ORDERS    Screening for HIV (human immunodeficiency virus)  -     HIV Antigen Antibody Combo; Future    Need for hepatitis C screening test  -     Hepatitis C Screen Reflex to HCV RNA Quant and Genotype; Future    Screening for lipid disorders  -     Lipid Profile (Chol, Trig, HDL, LDL calc); Future    Screening for diabetes mellitus  -     Glucose; Future    Need for vaccination  -     Hepatitis B Surface Antibody; Future  -     Hepatitis B surface antigen; Future    Please see patient instructions     Patient has been advised of split billing requirements and indicates understanding: Yes      COUNSELING:   Reviewed preventive health counseling, as reflected in patient instructions       Vision screening       Hearing screening       Aspirin prophylaxis        Alcohol Use        Consider Hep C screening for all patients one time for ages 18-79 years       HIV screeninx in teen years, 1x in adult years, and at intervals if high risk        He reports that he has never smoked. He has never used smokeless tobacco.        Maura Carrion MD  [facility]

## 2024-04-23 ENCOUNTER — PATIENT OUTREACH (OUTPATIENT)
Dept: CARE COORDINATION | Facility: CLINIC | Age: 34
End: 2024-04-23
Payer: COMMERCIAL

## 2024-05-07 ENCOUNTER — PATIENT OUTREACH (OUTPATIENT)
Dept: CARE COORDINATION | Facility: CLINIC | Age: 34
End: 2024-05-07
Payer: COMMERCIAL

## 2024-07-23 ENCOUNTER — PATIENT OUTREACH (OUTPATIENT)
Dept: CARE COORDINATION | Facility: CLINIC | Age: 34
End: 2024-07-23
Payer: COMMERCIAL

## 2024-07-26 ENCOUNTER — OFFICE VISIT (OUTPATIENT)
Dept: FAMILY MEDICINE | Facility: CLINIC | Age: 34
End: 2024-07-26
Payer: COMMERCIAL

## 2024-07-26 VITALS
OXYGEN SATURATION: 98 % | DIASTOLIC BLOOD PRESSURE: 70 MMHG | HEIGHT: 68 IN | SYSTOLIC BLOOD PRESSURE: 122 MMHG | RESPIRATION RATE: 20 BRPM | BODY MASS INDEX: 25.16 KG/M2 | WEIGHT: 166 LBS | HEART RATE: 96 BPM | TEMPERATURE: 97.1 F

## 2024-07-26 DIAGNOSIS — H69.93 DYSFUNCTION OF BOTH EUSTACHIAN TUBES: Primary | ICD-10-CM

## 2024-07-26 DIAGNOSIS — B07.8 COMMON WART: ICD-10-CM

## 2024-07-26 PROCEDURE — 99213 OFFICE O/P EST LOW 20 MIN: CPT | Performed by: PHYSICIAN ASSISTANT

## 2024-07-26 RX ORDER — FLUTICASONE PROPIONATE 50 MCG
2 SPRAY, SUSPENSION (ML) NASAL DAILY
Qty: 18 ML | Refills: 1 | Status: SHIPPED | OUTPATIENT
Start: 2024-07-26

## 2024-07-26 ASSESSMENT — PAIN SCALES - GENERAL: PAINLEVEL: MODERATE PAIN (4)

## 2024-07-26 NOTE — PATIENT INSTRUCTIONS
I recommend using over the counter antihistamines- Zyrtec, Allegra or Claritin during the day.  Trial of over the counter sudafed daily for 1 week or so.  Consider oral antibiotic pending above over the next 2 weeks.

## 2024-07-26 NOTE — PROGRESS NOTES
"  Assessment & Plan     Dysfunction of both eustachian tubes  Trial of antihistamines, sudafed and nasal spray; consider oral antibiotic if no improvement over the next 2 weeks or so. Return to clinic with any worsening or changes in symptoms and follow up with PCP for routine care.   - fluticasone (FLONASE) 50 MCG/ACT nasal spray; Spray 2 sprays into both nostrils daily    Common wart  Liquid nitrogen applied today, over the counter and supportive care discussed with patient as well.      BMI  Estimated body mass index is 25.24 kg/m  as calculated from the following:    Height as of this encounter: 1.727 m (5' 8\").    Weight as of this encounter: 75.3 kg (166 lb).       See Patient Instructions    Subjective   Terry is a 33 year old, presenting for the following health issues:  Cerumen Impaction        7/26/2024    10:41 AM   Additional Questions   Roomed by BRANDI STEPHENS   Accompanied by PRASHANTH         7/26/2024    10:41 AM   Patient Reported Additional Medications   Patient reports taking the following new medications N/A     History of Present Illness       Reason for visit:  My left ear is still clogged after several attempts with carbamide peroxide and flushing. I also have warts I'd like removed on my left hand and right arm.  Symptom onset:  3-7 days ago  Symptoms include:  Reduced hearing in the left ear.  Symptom intensity:  Mild  Symptom progression:  Staying the same  Had these symptoms before:  No    He eats 0-1 servings of fruits and vegetables daily.He consumes 0 sweetened beverage(s) daily.He exercises with enough effort to increase his heart rate 10 to 19 minutes per day.  He exercises with enough effort to increase his heart rate 4 days per week.   He is taking medications regularly.       Review of Systems  Constitutional, HEENT, cardiovascular, pulmonary, gi and gu systems are negative, except as otherwise noted.      Objective    /70 (BP Location: Left arm, Patient Position: Sitting, Cuff Size: " "Adult Regular)   Pulse 96   Temp 97.1  F (36.2  C) (Temporal)   Resp 20   Ht 1.727 m (5' 8\")   Wt 75.3 kg (166 lb)   SpO2 98%   BMI 25.24 kg/m    Body mass index is 25.24 kg/m .  Physical Exam   GENERAL: alert and no distress  EYES: Eyes grossly normal to inspection, PERRL and conjunctivae and sclerae normal  HENT: ear canals and TM's normal, nose and mouth without ulcers or lesions  NECK: no adenopathy, no asymmetry, masses, or scars  RESP: lungs clear to auscultation - no rales, rhonchi or wheezes  CV: regular rate and rhythm, normal S1 S2, no S3 or S4, no murmur, click or rub, no peripheral edema  MS: no gross musculoskeletal defects noted, no edema  SKIN: no suspicious lesions or rashes      WART:  3 Dome shaped grey/brown hyperkeratotic lesion(s) with verrucous appearance, black dots on surface.  Located left thumb, right ring finger and right forearm.    PROCEDURE:  Lesions wiped with alcohol pad. Liquid nitrogen was applied for 10 seconds to the skin lesion(s) x 3 with intermediate thaw.  The expected redness, blistering or scabbing reaction was explained.  The patient was reminded of the risk of scarring from the procedure. Instructed to place duct tape over warts to help with resolution. The patient was instructed to return (10 days - 4 weeks) if lesions fail to fully resolve. Patient tolerated the procedure well.        Signed Electronically by: Charly Hurd PA-C    "

## 2024-08-04 ENCOUNTER — HEALTH MAINTENANCE LETTER (OUTPATIENT)
Age: 34
End: 2024-08-04

## 2025-04-17 ENCOUNTER — OFFICE VISIT (OUTPATIENT)
Dept: FAMILY MEDICINE | Facility: CLINIC | Age: 35
End: 2025-04-17
Payer: COMMERCIAL

## 2025-04-17 VITALS
RESPIRATION RATE: 16 BRPM | HEIGHT: 69 IN | BODY MASS INDEX: 25.48 KG/M2 | SYSTOLIC BLOOD PRESSURE: 114 MMHG | OXYGEN SATURATION: 99 % | HEART RATE: 75 BPM | WEIGHT: 172 LBS | DIASTOLIC BLOOD PRESSURE: 70 MMHG | TEMPERATURE: 97.9 F

## 2025-04-17 DIAGNOSIS — B07.8 COMMON WART: Primary | ICD-10-CM

## 2025-04-17 ASSESSMENT — PAIN SCALES - GENERAL: PAINLEVEL_OUTOF10: NO PAIN (0)

## 2025-04-17 NOTE — PROGRESS NOTES
"  Assessment & Plan     Common wart  Plan:- DESTRUCT BENIGN LESION, UP TO 14  LQN - wart pared down with #15 scalpel and then treated with adeqauet ice ball and thaw time between 3 cycles.  Patient tolerated the procedure well  Wond care given  Follow up in 2-3 weeks, for repeat treatment -if wart has not resolved.    Tdap booster provided            Subjective   Terry is a 34 year old, presenting for the following health issues:  Wart        4/17/2025     9:08 AM   Additional Questions   Roomed by Porter KNOWLES     History of Present Illness       Reason for visit:  Wart on left thumb    He eats 2-3 servings of fruits and vegetables daily.He consumes 0 sweetened beverage(s) daily.He exercises with enough effort to increase his heart rate 10 to 19 minutes per day.  He exercises with enough effort to increase his heart rate 4 days per week.   He is taking medications regularly.      Left thumb-wart for > 2 yrs  Has been using  home based Dr Chin spray- helped some but still there .  Has had liquid nitrogen treatment on other warts and that helped  Want thumb wart to be treated as well     Warts  Onset/Duration: 4 years  Description (location/number): left thumb - one  Accompanying signs and symptoms (pain, redness): No  History: prior warts: YES  Therapies tried and outcome: liquid nitrogen and OTC meds            Review of Systems  Constitutional, HEENT, cardiovascular, pulmonary, gi and gu systems are negative, except as otherwise noted.      Objective    /70   Pulse 75   Temp 97.9  F (36.6  C) (Temporal)   Resp 16   Ht 1.746 m (5' 8.75\")   Wt 78 kg (172 lb)   SpO2 99%   BMI 25.59 kg/m    Body mass index is 25.59 kg/m .  Physical Exam   GENERAL: alert and no distress  Thumb , left, wart:a single round/dome  shaped brown hyperkeratotic lesion with verrucous appearance, black dots on surface, more visible after scalping the nonviable hyperkeratotic surface off.  LQN X 3. Adequate ice ball and thaw time in " between.  Patient tolerated well.  Wound bled minimal after scalpel use. Responded to pressure. Wound covered with bandage and wound care discussed         Signed Electronically by: Maura Carrion MD

## 2025-04-21 ENCOUNTER — PATIENT OUTREACH (OUTPATIENT)
Dept: CARE COORDINATION | Facility: CLINIC | Age: 35
End: 2025-04-21
Payer: COMMERCIAL

## 2025-05-05 ENCOUNTER — OFFICE VISIT (OUTPATIENT)
Dept: FAMILY MEDICINE | Facility: CLINIC | Age: 35
End: 2025-05-05
Payer: COMMERCIAL

## 2025-05-05 VITALS
DIASTOLIC BLOOD PRESSURE: 89 MMHG | HEART RATE: 70 BPM | TEMPERATURE: 97.7 F | WEIGHT: 172 LBS | SYSTOLIC BLOOD PRESSURE: 131 MMHG | BODY MASS INDEX: 25.48 KG/M2 | OXYGEN SATURATION: 97 % | RESPIRATION RATE: 16 BRPM | HEIGHT: 69 IN

## 2025-05-05 DIAGNOSIS — B07.8 COMMON WART: Primary | ICD-10-CM

## 2025-05-05 PROCEDURE — 3075F SYST BP GE 130 - 139MM HG: CPT | Performed by: FAMILY MEDICINE

## 2025-05-05 PROCEDURE — 1126F AMNT PAIN NOTED NONE PRSNT: CPT | Performed by: FAMILY MEDICINE

## 2025-05-05 PROCEDURE — 17110 DESTRUCTION B9 LES UP TO 14: CPT | Performed by: FAMILY MEDICINE

## 2025-05-05 PROCEDURE — 3079F DIAST BP 80-89 MM HG: CPT | Performed by: FAMILY MEDICINE

## 2025-05-05 ASSESSMENT — PAIN SCALES - GENERAL: PAINLEVEL_OUTOF10: NO PAIN (0)

## 2025-05-05 NOTE — PROGRESS NOTES
"  Assessment & Plan     Common wart  We discussed treatment options and decided to proceed with another round of cryotherapy which he has tolerated well in the past.  I applied 3 freeze thaw cycles of liquid nitrogen which she tolerated well.  Wound care instructions were given.  He may follow-up if this does not resolve.  - DESTRUCT BENIGN LESION, UP TO 14              Subjective   Terry is a 34 year old, presenting for the following health issues:  Derm Problem (Wart treatment left thumb)      5/5/2025     6:53 AM   Additional Questions   Roomed by rima     History of Present Illness       Reason for visit:  Wart on left thumb.    He eats 2-3 servings of fruits and vegetables daily.He consumes 0 sweetened beverage(s) daily.He exercises with enough effort to increase his heart rate 10 to 19 minutes per day.  He exercises with enough effort to increase his heart rate 3 or less days per week.   He is taking medications regularly.                Terry is a 34-year-old gentleman who presents to the clinic to have a wart on his left thumb treated.  This has been present for greater than 3 years.  He was using an over-the-counter spray with modest improvement.  He underwent cryotherapy on 7/26/2024 and 4/17/2025 which was helpful, but the wart has not resolved yet.            Review of Systems  Constitutional, HEENT, cardiovascular, pulmonary, GI, , musculoskeletal, neuro, skin, endocrine and psych systems are negative, except as otherwise noted.      Objective    /89   Pulse 70   Temp 97.7  F (36.5  C) (Temporal)   Resp 16   Ht 1.74 m (5' 8.5\")   Wt 78 kg (172 lb)   SpO2 97%   BMI 25.77 kg/m    Body mass index is 25.77 kg/m .  Physical Exam   GENERAL: alert and no distress  EYES: Eyes grossly normal to inspection, conjunctivae and sclerae normal  MS: no gross musculoskeletal defects noted in the hands, no edema  SKIN: There is a circular hyperkeratotic, slightly raised lesion consistent with a wart on the " tip of his left thumb.  NEURO: Normal strength and tone, mentation intact and speech normal  PSYCH: mentation appears normal, affect normal/bright            Signed Electronically by: Avelino Valentine, DO

## 2025-05-21 ENCOUNTER — OFFICE VISIT (OUTPATIENT)
Dept: FAMILY MEDICINE | Facility: CLINIC | Age: 35
End: 2025-05-21
Payer: COMMERCIAL

## 2025-05-21 VITALS
HEIGHT: 69 IN | HEART RATE: 91 BPM | RESPIRATION RATE: 16 BRPM | TEMPERATURE: 98.9 F | BODY MASS INDEX: 25.77 KG/M2 | SYSTOLIC BLOOD PRESSURE: 121 MMHG | OXYGEN SATURATION: 97 % | DIASTOLIC BLOOD PRESSURE: 82 MMHG | WEIGHT: 174 LBS

## 2025-05-21 DIAGNOSIS — B07.9 VIRAL WART OF THUMB: Primary | ICD-10-CM

## 2025-05-21 PROCEDURE — 17110 DESTRUCTION B9 LES UP TO 14: CPT | Performed by: INTERNAL MEDICINE

## 2025-05-21 PROCEDURE — 3074F SYST BP LT 130 MM HG: CPT | Performed by: INTERNAL MEDICINE

## 2025-05-21 PROCEDURE — 3079F DIAST BP 80-89 MM HG: CPT | Performed by: INTERNAL MEDICINE

## 2025-05-21 PROCEDURE — 1126F AMNT PAIN NOTED NONE PRSNT: CPT | Performed by: INTERNAL MEDICINE

## 2025-05-21 ASSESSMENT — PAIN SCALES - GENERAL: PAINLEVEL_OUTOF10: NO PAIN (0)

## 2025-05-21 NOTE — PROGRESS NOTES
"  Assessment & Plan   Problem List Items Addressed This Visit    None  Visit Diagnoses         Viral wart of thumb    -  Primary             We discussed treatment options and decided to proceed with another round of cryotherapy which he has tolerated well in the past. I applied 4 freeze thaw cycles of liquid nitrogen which she tolerated well. Wound care instructions were given. He may follow-up if this does not resolve      BMI  Estimated body mass index is 25.88 kg/m  as calculated from the following:    Height as of this encounter: 1.746 m (5' 8.75\").    Weight as of this encounter: 78.9 kg (174 lb).           Subjective   Terry is a 34 year old, presenting for the following health issues:  Wart        5/5/2025     6:53 AM   Additional Questions   Roomed by rima     History of Present Illness       Reason for visit:  Wart on left thumb    He eats 0-1 servings of fruits and vegetables daily.He consumes 0 sweetened beverage(s) daily.He exercises with enough effort to increase his heart rate 10 to 19 minutes per day.  He exercises with enough effort to increase his heart rate 4 days per week.   He is taking medications regularly.      Terry is a 34-year-old gentleman who presents to the clinic to have a wart on his left thumb treated.  This has been present for greater than 3 years.  He was using an over-the-counter spray with modest improvement.  He underwent cryotherapy on 7/26/2024 and 4/17/2025 and 5/5/2025, which was helpful, but the wart has not resolved yet.       Review of Systems  Constitutional, HEENT, cardiovascular, pulmonary, gi and gu systems are negative, except as otherwise noted.      Objective    /82 (BP Location: Left arm, Patient Position: Sitting, Cuff Size: Adult Large)   Pulse 91   Temp 98.9  F (37.2  C) (Temporal)   Resp 16   Ht 1.746 m (5' 8.75\")   Wt 78.9 kg (174 lb)   SpO2 97%   BMI 25.88 kg/m    Body mass index is 25.88 kg/m .  Physical Exam   GENERAL: alert and no " distress  EYES: Eyes grossly normal to inspection, conjunctivae and sclerae normal  MS: no gross musculoskeletal defects noted in the hands, no edema  SKIN: There is a circular hyperkeratotic, slightly depressed, peeling lesion consistent with a wart on the tip of his left thumb.  NEURO: Normal strength and tone, mentation intact and speech normal  PSYCH: mentation appears normal, affect normal/bright            Signed Electronically by: Yolande Aguero MD